# Patient Record
Sex: MALE | Race: WHITE | NOT HISPANIC OR LATINO | URBAN - NONMETROPOLITAN AREA
[De-identification: names, ages, dates, MRNs, and addresses within clinical notes are randomized per-mention and may not be internally consistent; named-entity substitution may affect disease eponyms.]

---

## 2018-02-16 PROBLEM — D31.32: Noted: 2018-02-16

## 2018-02-16 PROBLEM — H52.4: Noted: 2017-02-10

## 2018-02-16 PROBLEM — D31.31: Noted: 2018-02-16

## 2018-02-16 PROBLEM — H25.13: Noted: 2018-02-16

## 2019-02-18 ENCOUNTER — IMPORTED ENCOUNTER (OUTPATIENT)
Dept: URBAN - NONMETROPOLITAN AREA CLINIC 1 | Facility: CLINIC | Age: 54
End: 2019-02-18

## 2019-02-18 PROCEDURE — 92310 CONTACT LENS FITTING OU: CPT

## 2019-02-18 PROCEDURE — 99214 OFFICE O/P EST MOD 30 MIN: CPT

## 2019-02-18 PROCEDURE — 92015 DETERMINE REFRACTIVE STATE: CPT

## 2019-02-18 PROCEDURE — 92250 FUNDUS PHOTOGRAPHY W/I&R: CPT

## 2019-02-18 NOTE — PATIENT DISCUSSION
Choroidal Nevus OUDiscussed findings of exam in detail with the patient. Discussed the risk of choroidal melanoma and the importance of monitoring for this disease. Optos done today: appears stable. Continue to monteda. Amina MARLOW Discussed diagnosis with patient. Reviewed symptoms related to cataract progression. Discussed various treatment options with patient. No treatment required at this time. Continue to monitor. Presbyopia OUDiscussed refractive status with patient. New glasses and contact Rx given today. Discussed proper care replacement and hygiene. Continue to monitor.; 's Notes: MR 2/16/18Optos 2/16/18

## 2021-01-21 ENCOUNTER — PREPPED CHART (OUTPATIENT)
Dept: URBAN - NONMETROPOLITAN AREA CLINIC 1 | Facility: CLINIC | Age: 56
End: 2021-01-21

## 2021-01-21 NOTE — PATIENT DISCUSSION
Discussed findings of exam in detail with the patient. Discussed the risk of choroidal melanoma and the importance of monitoring for this disease. Optos done today: appears stable. Continue to montior.

## 2021-01-21 NOTE — PATIENT DISCUSSION
Discussed diagnosis with patient. Reviewed symptoms related to cataract progression. Discussed various treatment options with patient. No treatment required at this time. Continue to monitor.

## 2021-01-21 NOTE — PATIENT DISCUSSION
Discussed refractive status with patient. New glasses and contact Rx given today. Discussed proper care, replacement and hygiene. Continue to monitor.

## 2021-01-22 ENCOUNTER — IMPORTED ENCOUNTER (OUTPATIENT)
Dept: URBAN - NONMETROPOLITAN AREA CLINIC 1 | Facility: CLINIC | Age: 56
End: 2021-01-22

## 2021-01-22 PROCEDURE — 92015 DETERMINE REFRACTIVE STATE: CPT

## 2021-01-22 PROCEDURE — 92250 FUNDUS PHOTOGRAPHY W/I&R: CPT

## 2021-01-22 PROCEDURE — 99214 OFFICE O/P EST MOD 30 MIN: CPT

## 2021-01-22 PROCEDURE — 92310 CONTACT LENS FITTING OU: CPT

## 2021-01-22 NOTE — PATIENT DISCUSSION
Choroidal Nevus OUDiscussed findings of exam in detail with the patient. Discussed the risk of choroidal melanoma and the importance of monitoring for this disease. Optos done today: appears stable. Continue to monteda. Amina MARLOW Discussed diagnosis with patient. Reviewed symptoms related to cataract progression. Discussed various treatment options with patient. No treatment required at this time. Continue to monitor. Presbyopia OUDiscussed refractive status with patient. New glasses and contact Rx given today. Discussed proper care replacement and hygiene. Continue to monitor.; 's Notes: MR  1/22/21Optos 1/22/21

## 2022-01-21 ASSESSMENT — KERATOMETRY
OS_K2POWER_DIOPTERS: 43.00
OS_AXISANGLE2_DEGREES: 076
OD_AXISANGLE_DEGREES: 31
OD_AXISANGLE2_DEGREES: 121
OS_AXISANGLE_DEGREES: 166
OD_K2POWER_DIOPTERS: 43.50
OS_K1POWER_DIOPTERS: 42.50
OD_K1POWER_DIOPTERS: 43.00

## 2022-01-21 ASSESSMENT — VISUAL ACUITY
OD_SC: 20/20
OS_SC: 20/20

## 2022-01-21 ASSESSMENT — TONOMETRY
OS_IOP_MMHG: 16
OD_IOP_MMHG: 16

## 2022-01-28 ENCOUNTER — COMPREHENSIVE EXAM (OUTPATIENT)
Dept: URBAN - NONMETROPOLITAN AREA CLINIC 1 | Facility: CLINIC | Age: 57
End: 2022-01-28

## 2022-01-28 DIAGNOSIS — H52.13: ICD-10-CM

## 2022-01-28 DIAGNOSIS — D31.31: ICD-10-CM

## 2022-01-28 DIAGNOSIS — D31.32: ICD-10-CM

## 2022-01-28 PROCEDURE — 92250 FUNDUS PHOTOGRAPHY W/I&R: CPT

## 2022-01-28 PROCEDURE — 99214 OFFICE O/P EST MOD 30 MIN: CPT

## 2022-01-28 PROCEDURE — 92015 DETERMINE REFRACTIVE STATE: CPT

## 2022-01-28 PROCEDURE — 92310 CONTACT LENS FITTING OU: CPT

## 2022-01-28 ASSESSMENT — TONOMETRY
OD_IOP_MMHG: 16
OS_IOP_MMHG: 16

## 2022-01-28 ASSESSMENT — KERATOMETRY
OD_K1POWER_DIOPTERS: 43.00
OS_AXISANGLE_DEGREES: 166
OS_K1POWER_DIOPTERS: 42.50
OS_AXISANGLE2_DEGREES: 076
OD_AXISANGLE_DEGREES: 31
OS_K2POWER_DIOPTERS: 43.00
OD_K2POWER_DIOPTERS: 43.50
OD_AXISANGLE2_DEGREES: 121

## 2022-01-28 ASSESSMENT — VISUAL ACUITY
OS_CC: 20/20
OD_CC: 20/20

## 2022-04-09 ASSESSMENT — VISUAL ACUITY
OS_SC: 20/20
OS_SC: 20/20
OD_SC: 20/20
OD_SC: 20/20

## 2022-04-09 ASSESSMENT — TONOMETRY
OD_IOP_MMHG: 16
OD_IOP_MMHG: 16
OS_IOP_MMHG: 16
OS_IOP_MMHG: 16

## 2024-02-22 ENCOUNTER — ESTABLISHED PATIENT (OUTPATIENT)
Dept: URBAN - NONMETROPOLITAN AREA CLINIC 1 | Facility: CLINIC | Age: 59
End: 2024-02-22

## 2024-02-22 DIAGNOSIS — H52.13: ICD-10-CM

## 2024-02-22 DIAGNOSIS — H25.13: ICD-10-CM

## 2024-02-22 PROCEDURE — 99214 OFFICE O/P EST MOD 30 MIN: CPT

## 2024-02-22 PROCEDURE — 92310-E CONTACT LENS FITTING ESTABLISH PATIENT

## 2024-02-22 PROCEDURE — 92015 DETERMINE REFRACTIVE STATE: CPT

## 2024-02-22 ASSESSMENT — KERATOMETRY
OS_AXISANGLE_DEGREES: 166
OD_K2POWER_DIOPTERS: 43.50
OS_K2POWER_DIOPTERS: 43.00
OS_AXISANGLE2_DEGREES: 076
OD_AXISANGLE2_DEGREES: 121
OD_K1POWER_DIOPTERS: 43.00
OS_K1POWER_DIOPTERS: 42.50
OD_AXISANGLE_DEGREES: 31

## 2024-02-22 ASSESSMENT — VISUAL ACUITY
OD_CC: 20/20
OS_CC: 20/20-1
OU_CC: 20/20

## 2024-02-22 ASSESSMENT — TONOMETRY
OD_IOP_MMHG: 16
OS_IOP_MMHG: 16

## 2025-02-24 ENCOUNTER — FOLLOW UP (OUTPATIENT)
Age: 60
End: 2025-02-24

## 2025-02-24 DIAGNOSIS — H52.13: ICD-10-CM

## 2025-02-24 PROCEDURE — 92310-1 LEVEL 1 SOFT LENS UPDATE

## 2025-02-24 PROCEDURE — 92014 COMPRE OPH EXAM EST PT 1/>: CPT

## 2025-02-24 PROCEDURE — 92015 DETERMINE REFRACTIVE STATE: CPT

## 2025-08-11 ENCOUNTER — APPOINTMENT (OUTPATIENT)
Dept: URBAN - NONMETROPOLITAN AREA CLINIC 49 | Age: 60
Setting detail: DERMATOLOGY
End: 2025-08-29

## 2025-08-11 PROBLEM — C44.319 BASAL CELL CARCINOMA OF SKIN OF OTHER PARTS OF FACE: Status: ACTIVE | Noted: 2025-08-11
